# Patient Record
(demographics unavailable — no encounter records)

---

## 2024-12-02 NOTE — ASSESSMENT
[FreeTextEntry1] : Trichotillomania with paresthesia present. Education. OK to try elocon solution bid prn for discomfort. Also can try lidocaine solution prn. May require gabapentin if severe.  Cafe-au-lait patch  -  discussed.

## 2024-12-02 NOTE — HISTORY OF PRESENT ILLNESS
[FreeTextEntry1] : Patient for pain on the vertex of the scalp. [de-identified] : Trichotillomania for several years, at a solitary patch on the vertex.  Recently marked stabbing pain intermittently at this site.  Txs with buproprion and adderall (for ADHD0 have not been helpful.

## 2024-12-02 NOTE — PHYSICAL EXAM
[FreeTextEntry3] : Vertex of scalp with 3 cm patch of short hairs.  Skin of the scalp is intact and WNL's.  Left inferior flank with a ~7 cm cafe au lait patch.

## 2025-01-15 NOTE — ASSESSMENT
[FreeTextEntry1] : The patient has camptodactyly type I.  I do not recommend surgical intervention as the contracture is mild.  It is unusual that she reports pain as this is typically a painless problem.  In my experience FDS to lateral band is not ideal for such a mild contracture.  Will write a prescription for hand therapy for her to do in [place] where she goes to school

## 2025-01-15 NOTE — HISTORY OF PRESENT ILLNESS
[Right] : right hand dominant [FreeTextEntry1] : Patient presents today for evaluation of contracture of the right small finger.  She started noticing it 10 years ago which keeps getting worse.  Patient reports that she does not recall any trauma.  Patient reports that she has seen a couple of surgeons and was told that the surgery could be risky.  Patient reports that she has tried occupational therapy, splints, braces with minimal relief. She also complains of intermittent pain in the finger which shoots down to the wrist.

## 2025-01-15 NOTE — PHYSICAL EXAM
[de-identified] : On exam she has a 30 degree PIP flexion contracture of her right small finger.  No coronal malalignment.  There is a hard endpoint.  Full composite flexion.  Skin intact.  Full MCP joint range of motion [de-identified] : PA lateral oblique x-rays demonstrate a flexed posture of the right small finger PIP joint

## 2025-07-14 NOTE — PHYSICAL EXAM
[No Acute Distress] : no acute distress [Well Nourished] : well nourished [Well Developed] : well developed [Well-Appearing] : well-appearing [Normal Sclera/Conjunctiva] : normal sclera/conjunctiva [EOMI] : extraocular movements intact [Normal Outer Ear/Nose] : the outer ears and nose were normal in appearance [Normal Oropharynx] : the oropharynx was normal [Normal TMs] : both tympanic membranes were normal [No Lymphadenopathy] : no lymphadenopathy [Supple] : supple [Thyroid Normal, No Nodules] : the thyroid was normal and there were no nodules present [No Respiratory Distress] : no respiratory distress  [No Accessory Muscle Use] : no accessory muscle use [Clear to Auscultation] : lungs were clear to auscultation bilaterally [Normal Rate] : normal rate  [Regular Rhythm] : with a regular rhythm [Normal S1, S2] : normal S1 and S2 [No Murmur] : no murmur heard [No Edema] : there was no peripheral edema [Soft] : abdomen soft [Non Tender] : non-tender [Non-distended] : non-distended [Normal Bowel Sounds] : normal bowel sounds [No Focal Deficits] : no focal deficits [Normal Affect] : the affect was normal [Normal Insight/Judgement] : insight and judgment were intact

## 2025-07-14 NOTE — HISTORY OF PRESENT ILLNESS
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 Pt had been regularly following with her pediatrician in the past.  Pt has been doing well and denies any acute concerns.  She has history of anxiety, ADHD, and trichotillomania for which she follows with psychiatry q1m and a therapist [id number]. She is on lexapro 10mg, was previously on bupropion however this was recently discontinued. Pt states she feels her mood is better off of it, however her trichotillomania is worsened.  She follows with GYN, has had pelvic exams in the past but unsure if she has had a pap. She has hx breast pain and possible lumps for which she has had breast US that was negative. She is on slynd (drosperinone) which is an estrogen free birth control, due to side effects of weight gain. She states her periods are generally regular but can be heavy and she does have pain with periods that is tolerable. She does not keep track of her periods, but does state she has had one in the last month. She is sexually active with her boyfriend and does not use barrier protection. She has an appt with GYN in 2 weeks.  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 She has seen specialists for this, however they do not recommend surgical intervention.

## 2025-07-14 NOTE — ASSESSMENT
[Vaccines Reviewed] : Immunizations reviewed today. Please see immunization details in the vaccine log within the immunization flowsheet.  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

## 2025-07-14 NOTE — HEALTH RISK ASSESSMENT
[Excellent] : ~his/her~  mood as  excellent [Yes] : Yes [0] : 2) Feeling down, depressed, or hopeless: Not at all (0) [With Family] : lives with family [College] : College [Fully functional (bathing, dressing, toileting, transferring, walking, feeding)] : Fully functional (bathing, dressing, toileting, transferring, walking, feeding) [Fully functional (using the telephone, shopping, preparing meals, housekeeping, doing laundry, using] : Fully functional and needs no help or supervision to perform IADLs (using the telephone, shopping, preparing meals, housekeeping, doing laundry, using transportation, managing medications and managing finances) [Never] : Never [Monthly or less (1 pt)] : Monthly or less (1 point) [1 or 2 (0 pts)] : 1 or 2 (0 points) [Never (0 pts)] : Never (0 points) [No] : In the past 12 months have you used drugs other than those required for medical reasons? No [No falls in past year] : Patient reported no falls in the past year [PHQ-2 Negative - No further assessment needed] : PHQ-2 Negative - No further assessment needed [Employed] : employed [Student] : student [Sexually Active] : sexually active [Feels Safe at Home] : Feels safe at home [Seat Belt] :  uses seat belt [Patient/Caregiver not ready to engage] : , patient/caregiver not ready to engage [Audit-CScore] : 1 [de-identified] : marijuana use [de-identified] : walks every morning [de-identified] : protein, carbs, healthy foods [[id number]] : 0 [Change in mental status noted] : No change in mental status noted [Language] : denies difficulty with language [Behavior] : denies difficulty with behavior [Reasoning] : denies difficulty with reasoning [High Risk Behavior] : no high risk behavior [Reports changes in hearing] : Reports no changes in hearing [Reports changes in vision] : Reports no changes in vision [Reports changes in dental health] : Reports no changes in dental health [FreeTextEntry2] : [employer]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

## 2025-07-14 NOTE — HEALTH RISK ASSESSMENT
[Excellent] : ~his/her~  mood as  excellent [Yes] : Yes [0] : 2) Feeling down, depressed, or hopeless: Not at all (0) [With Family] : lives with family [College] : College [Fully functional (bathing, dressing, toileting, transferring, walking, feeding)] : Fully functional (bathing, dressing, toileting, transferring, walking, feeding) [Fully functional (using the telephone, shopping, preparing meals, housekeeping, doing laundry, using] : Fully functional and needs no help or supervision to perform IADLs (using the telephone, shopping, preparing meals, housekeeping, doing laundry, using transportation, managing medications and managing finances) [Never] : Never [Monthly or less (1 pt)] : Monthly or less (1 point) [1 or 2 (0 pts)] : 1 or 2 (0 points) [Never (0 pts)] : Never (0 points) [No] : In the past 12 months have you used drugs other than those required for medical reasons? No [No falls in past year] : Patient reported no falls in the past year [PHQ-2 Negative - No further assessment needed] : PHQ-2 Negative - No further assessment needed [Employed] : employed [Student] : student [Sexually Active] : sexually active [Feels Safe at Home] : Feels safe at home [Seat Belt] :  uses seat belt [Patient/Caregiver not ready to engage] : , patient/caregiver not ready to engage [Audit-CScore] : 1 [de-identified] : marijuana use [de-identified] : walks every morning [de-identified] : protein, carbs, healthy foods [[id number]] : 0 [Change in mental status noted] : No change in mental status noted [Language] : denies difficulty with language [Behavior] : denies difficulty with behavior [Reasoning] : denies difficulty with reasoning [High Risk Behavior] : no high risk behavior [Reports changes in hearing] : Reports no changes in hearing [Reports changes in vision] : Reports no changes in vision [Reports changes in dental health] : Reports no changes in dental health [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

## 2025-07-14 NOTE — PAST MEDICAL HISTORY
[Menstruating] : menstruating [FreeTextEntry1] : heavy bleeding. LMP within the last month however does not keep track

## 2025-07-14 NOTE — ASSESSMENT
[Vaccines Reviewed] : Immunizations reviewed today. Please see immunization details in the vaccine log within the immunization flowsheet.  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

## 2025-07-14 NOTE — HISTORY OF PRESENT ILLNESS
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 Pt had been regularly following with her pediatrician in the past.  Pt has been doing well and denies any acute concerns.  She has history of anxiety, ADHD, and trichotillomania for which she follows with psychiatry q1m and a therapist [id number]. She is on lexapro 10mg, was previously on bupropion however this was recently discontinued. Pt states she feels her mood is better off of it, however her trichotillomania is worsened.  She follows with GYN, has had pelvic exams in the past but unsure if she has had a pap. She has hx breast pain and possible lumps for which she has had breast US that was negative. She is on slynd (drosperinone) which is an estrogen free birth control, due to side effects of weight gain. She states her periods are generally regular but can be heavy and she does have pain with periods that is tolerable. She does not keep track of her periods, but does state she has had one in the last month. She is sexually active with her boyfriend and does not use barrier protection. She has an appt with GYN in 2 weeks.  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 She has seen specialists for this, however they do not recommend surgical intervention.